# Patient Record
Sex: MALE | Race: WHITE | Employment: UNEMPLOYED | URBAN - METROPOLITAN AREA
[De-identification: names, ages, dates, MRNs, and addresses within clinical notes are randomized per-mention and may not be internally consistent; named-entity substitution may affect disease eponyms.]

---

## 2018-04-18 ENCOUNTER — TELEPHONE (OUTPATIENT)
Dept: INTERNAL MEDICINE CLINIC | Facility: CLINIC | Age: 18
End: 2018-04-18

## 2018-05-08 NOTE — TELEPHONE ENCOUNTER
Please reach out to pt to confirm their status here; schedule OV if pt will continue care here. If not, please document and route to [place]. Thank you. No future appointments.

## 2021-05-04 ENCOUNTER — HOSPITAL ENCOUNTER (OUTPATIENT)
Age: 21
Discharge: HOME | End: 2021-05-04
Payer: COMMERCIAL

## 2021-05-04 VITALS
HEIGHT: 73 IN | WEIGHT: 190 LBS | TEMPERATURE: 99 F | HEART RATE: 112 BPM | RESPIRATION RATE: 18 BRPM | SYSTOLIC BLOOD PRESSURE: 136 MMHG | OXYGEN SATURATION: 97 % | DIASTOLIC BLOOD PRESSURE: 85 MMHG | BODY MASS INDEX: 25.18 KG/M2

## 2021-05-04 DIAGNOSIS — Z20.822 CLOSE EXPOSURE TO COVID-19 VIRUS: Primary | ICD-10-CM

## 2021-05-04 PROCEDURE — 99202 OFFICE O/P NEW SF 15 MIN: CPT

## 2021-05-04 NOTE — ED INITIAL ASSESSMENT (HPI)
Requesting Covid test. Exposed to father (same household) with positive Covid infection tested today. Denies Covid symptoms.

## 2021-05-04 NOTE — ED PROVIDER NOTES
Patient Seen in: [facility]      History   Patient presents with:  Covid-19 Test    Stated Complaint: exposure    HPI/Subjective:   HPI    Vi Allred is a 20-year-old who presents for Covid testing today he denies past medical history.   Patient rhonchi appreciated. Musculoskeletal: Normal range of motion. No edema or tenderness. Neurological: CN III - XII grossly intact, normal strength and sensation. Skin: Skin is warm and dry. No rash noted. No erythema.       ED Course     Labs Reviewed